# Patient Record
Sex: MALE | Race: OTHER | HISPANIC OR LATINO
[De-identification: names, ages, dates, MRNs, and addresses within clinical notes are randomized per-mention and may not be internally consistent; named-entity substitution may affect disease eponyms.]

---

## 2017-09-24 ENCOUNTER — FORM ENCOUNTER (OUTPATIENT)
Age: 42
End: 2017-09-24

## 2017-09-26 ENCOUNTER — FORM ENCOUNTER (OUTPATIENT)
Age: 42
End: 2017-09-26

## 2019-02-05 ENCOUNTER — FORM ENCOUNTER (OUTPATIENT)
Age: 44
End: 2019-02-05

## 2023-04-26 ENCOUNTER — APPOINTMENT (OUTPATIENT)
Dept: PODIATRY | Facility: CLINIC | Age: 48
End: 2023-04-26
Payer: COMMERCIAL

## 2023-04-26 DIAGNOSIS — M21.42 FLAT FOOT [PES PLANUS] (ACQUIRED), RIGHT FOOT: ICD-10-CM

## 2023-04-26 DIAGNOSIS — M77.41 METATARSALGIA, RIGHT FOOT: ICD-10-CM

## 2023-04-26 DIAGNOSIS — M72.2 PLANTAR FASCIAL FIBROMATOSIS: ICD-10-CM

## 2023-04-26 DIAGNOSIS — Z82.49 FAMILY HISTORY OF ISCHEMIC HEART DISEASE AND OTHER DISEASES OF THE CIRCULATORY SYSTEM: ICD-10-CM

## 2023-04-26 DIAGNOSIS — Z80.9 FAMILY HISTORY OF MALIGNANT NEOPLASM, UNSPECIFIED: ICD-10-CM

## 2023-04-26 DIAGNOSIS — Z82.61 FAMILY HISTORY OF ARTHRITIS: ICD-10-CM

## 2023-04-26 DIAGNOSIS — M77.42 METATARSALGIA, RIGHT FOOT: ICD-10-CM

## 2023-04-26 DIAGNOSIS — M21.41 FLAT FOOT [PES PLANUS] (ACQUIRED), RIGHT FOOT: ICD-10-CM

## 2023-04-26 PROCEDURE — 99213 OFFICE O/P EST LOW 20 MIN: CPT

## 2023-05-03 PROBLEM — M21.41 PES PLANUS OF BOTH FEET: Status: ACTIVE | Noted: 2023-05-02

## 2023-05-03 PROBLEM — M77.41 METATARSALGIA OF BOTH FEET: Status: ACTIVE | Noted: 2023-05-02

## 2023-05-03 PROBLEM — M72.2 PLANTAR FASCIITIS: Status: ACTIVE | Noted: 2023-05-02

## 2023-05-03 NOTE — HISTORY OF PRESENT ILLNESS
[Sneakers] : rebeca [FreeTextEntry1] : Patient presents today complaining of pain at the ball of the forefoot. He has a flatfoot with generalized fasciitis. No recent trauma. He feels the orthotics are starting to come apart.

## 2023-05-03 NOTE — ASSESSMENT
[FreeTextEntry1] : \par Impression: Flatfoot. Plantar fasciitis. Metatarsalgia.\par \par Treatment: I enucleated the keratotic lesions. I gave him home exercises regarding stretching and therapy exercises. I referred him to Suleiman, the orthotist, to have his orthotics refurbished, adding a metatarsal pad. He will work on the stretching exercises and follow-up in the office only with continued pain that persists.

## 2023-05-03 NOTE — PHYSICAL EXAM
[2+] : left foot dorsalis pedis 2+ [Sensation] : the sensory exam was normal to light touch and pinprick [No Focal Deficits] : no focal deficits [Deep Tendon Reflexes (DTR)] : deep tendon reflexes were 2+ and symmetric [Motor Exam] : the motor exam was normal [Ankle Swelling (On Exam)] : not present [Varicose Veins Of Lower Extremities] : not present [Delayed in the Right Toes] : capillary refills normal in right toes [Delayed in the Left Toes] : capillary refills normal in the left toes [FreeTextEntry3] : Hair growth noted on digits. Proximal to distal cooling is within normal limits.  [de-identified] : Fully compensated forefoot varus with flexible flatfoot. Hyperpronation. Low profile arch with minor plantar fasciitis and metatarsalgia from keratosis. [FreeTextEntry1] : Submetatarsal 2, 3 and 4 keratosis.

## 2025-03-03 ENCOUNTER — APPOINTMENT (OUTPATIENT)
Dept: PODIATRY | Facility: CLINIC | Age: 50
End: 2025-03-03

## 2025-03-03 DIAGNOSIS — M21.41 FLAT FOOT [PES PLANUS] (ACQUIRED), RIGHT FOOT: ICD-10-CM

## 2025-03-03 DIAGNOSIS — M79.673 PAIN IN UNSPECIFIED FOOT: ICD-10-CM

## 2025-03-03 DIAGNOSIS — M21.42 FLAT FOOT [PES PLANUS] (ACQUIRED), RIGHT FOOT: ICD-10-CM

## 2025-03-03 DIAGNOSIS — M79.2 NEURALGIA AND NEURITIS, UNSPECIFIED: ICD-10-CM

## 2025-03-03 PROCEDURE — 99213 OFFICE O/P EST LOW 20 MIN: CPT

## 2025-03-11 PROBLEM — M79.2 NEURALGIA: Status: ACTIVE | Noted: 2025-03-05

## 2025-03-11 PROBLEM — M79.673 PAIN, FOOT: Status: ACTIVE | Noted: 2025-03-05
